# Patient Record
Sex: FEMALE | Race: WHITE | NOT HISPANIC OR LATINO | URBAN - METROPOLITAN AREA
[De-identification: names, ages, dates, MRNs, and addresses within clinical notes are randomized per-mention and may not be internally consistent; named-entity substitution may affect disease eponyms.]

---

## 2023-07-11 ENCOUNTER — APPOINTMENT (OUTPATIENT)
Dept: PRIMARY CARE | Facility: CLINIC | Age: 63
End: 2023-07-11
Payer: COMMERCIAL

## 2023-10-02 PROCEDURE — 99215 OFFICE O/P EST HI 40 MIN: CPT | Performed by: INTERNAL MEDICINE

## 2023-10-23 PROCEDURE — 99215 OFFICE O/P EST HI 40 MIN: CPT | Performed by: INTERNAL MEDICINE

## 2023-11-01 PROCEDURE — 99215 OFFICE O/P EST HI 40 MIN: CPT | Performed by: INTERNAL MEDICINE

## 2023-11-29 NOTE — PROGRESS NOTES
HISTORY OF PRESENT ILLNESS:  Harsh is a 63 y.o. female with a personal history of colon cancer (diagnosed 2x). She also has a family history of pancreatic, testicular and lung cancer.  She was referred to the Cancer Genetics Clinic at [facility] by her physician, Dr. Russell.  She is interested in genetic testing to clarify her personal risks for cancer, as well as the risks to her family members.   The appointment today was conducted via video.    CANCER MEDICAL HISTORY:  Personal History of Cancer?    Yes  Per med/onc note 11/20/23:   Hx of locally advanced colon carcinoma--dx at 53  T4 N0 M0 sigmoid colon carcinoma with perforation causing abscess as well as complete obstruction of left sigmoid colon-- s/p partial colectomy and Meliton procedure  Tumor was locally advanced and positive  Radial margin   58 lymph nodes were negative  10/13/13: Gross rumor was thought to be present postoperatively in radial margin grossly as well as microscopically positive   Had left sided hydroureteronephrosis secondary to mass effect from tumor, s/p insertion of left double-J ureteral catheter insertion  Received chemo using FOLFOX with Avastin  Finished 12 cycles of chemo  6/2014-- FUP PET scan was negative  7/2014-- received adjuvant radiation therapy  10/7/2014-- revision of colostomy-- coloproctostomy and additional resection of colon and no pathological disease was identified  *Did not do FUP colonoscopy or any other clinical FUP after 4/2018*  Per med/onc note 11/20/23:   7/2023-- found to have obstructive transverse colonoic mass  Found to have T3 N0 M0 stage IIa colon carcinoma s/p transverse colectomy (7/17/23) and found to have moderately differentiated carcinoma arising in transverse colon  35 lymph nodes were negative-- negative MSI was stable and all margins were negative  Recommended adjuvant Xeloda for 6 months  Signatera test baseline came back negative    PREVIOUS GENETIC TESTING?  No    CANCER  SCREENING HISTORY:  Mammograms?   Yes-- all previous mammograms have been normal.,  Breast biopsies?  No.  Vaginal ultrasound to screen for ovarian cancer?   No.  CA-125?   Yes--5/26/13-- 31  PAP smear?   Yes-- all normal  Colonoscopy?   Yes.  Endoscopy?  Yes.  Hysterectomy?  No.  Oophorectomy?  No.  Dermatology?   Mole by ear in 20's-- had it removed.    OTHER MEDICAL CONCERNS:  COPD  Anxiety   Chronic back pain  Lumbar laminectomy    REPRODUCTIVE HISTORY:  # Children:   4.  # Pregnancies:   5.   Age first birth:   22.  Breast feeding?:   No  Menarche (age):   10.  Menopause (age):   52.  OCP:    No   HRT:   No     SOCIAL HISTORY:  Not currently working-- used to work in resale shop-- processed jewelry and shoes.    FAMILY HISTORY:  A 4-generation pedigree was obtained. The family history was significant for the following:  Maternal aunt was diagnosed with pancreatic cancer in her early-50's-- she passed away at 80.  Maternal uncle was diagnosed with testicular cancer in his late 40s.  Maternal first cousin, male in his 60's, was diagnosed with testicular cancer in his 40's.  Maternal first cousin, male in his 60's, was diagnosed with testicular cancer in his 40's.  Maternal first cousin, male, was diagnosed with testicular cancer in his early 40's. He was diagnosed a second time with testicular cancer in his late 40's. He passed away at 50.  Maternal first cousin, 34 year old male, was diagnosed with testicular cancer in his 20's.  Mother had AFIB and passed away at 85 from an 'aneurysm in her neck.'  Maternal grandfather passed away at 36 from a 'bad heart.'  Maternal grandmother passed away at 66 -- she was a heavy smoker.    Father was diagnosed with lung cancer in his 60's -- he passed away in his 60's (he was a smoker).  Paternal grandmother passed away at 19 possibly from colon cancer (not been confirmed).    Consanguinity and Ashkenazi Jainism ancestry were denied. The patient's maternal side is of [facility]  and [place] descent, and the patient's paternal side is of [place] and [place] descent. The remainder of the family history was negative for intellectual disability, birth defects, miscarriages/stillbirths, blindness, deafness, kidney disease, heart disease, cancer, muscle disease, and blood disorders.    DISCUSSION:  Harsh is a 63 y.o. female with a personal history of colon cancer (diagnosed 2x). She also has a family history of pancreatic, testicular and lung cancer.  Harsh meets NCCN criteria for testing for the high risk breast and ovarian cancer based on her family history. She may meet criteria for the Talavera syndrome genes if her paternal grandmother truly had colon cancer.  Even though Harsh's tumor showed intact staining for mismatch repair proteins, it is possible her personal/family history could be due to Talavera syndrome. That screening has a 5-10% false negative rate.  It is also possible it could be due to a hereditary polyposis condition such as attenuated APC-related familial adenomatous polyposis (AFAP) or MUTYH-associated polyposis (MAP).  She is interested in testing, which is recommended, and will be ordered today via the 47-gene Common Hereditary Cancers panel and the 22-gene Guidelines Based Colorectal Cancer panel from [employer].   Our discussion is summarized below.     We discussed the genetics of colon cancer, and the features of inherited cancer syndromes.   We discussed that MOST cancers are not due to an inherited genetic susceptibility.   Red flags associated with hereditary colon cancer include factors such as:  young age at diagnosis (by or before age 50),   three or more family members with colon cancer,   family history of other cancers (such as uterine cancer) which can occur in families with hereditary colon cancer  However, in about 5-10% of families, there is an identifiable inherited genetic mutation that can make a person more susceptible to develop certain forms of  cancer.  A genetic mutation can be found in 9-26% of patients diagnosed with colorectal cancer before the age of 50 (PMID: 60989383).    Talavera syndrome, also known as hereditary non-polyposis colorectal cancer (HNPCC), is a type of inherited cancer of the digestive tract.   Up to 5% of all colon cancer cases are attributed to Talavera syndrome.  Changes in the Talavera syndrome genes (sometimes referred to as mutations) are inherited in a dominant pattern.  This means that if an individual has a change in either of these genes, their siblings and children have a 50% chance of also having that gene change and a 50% chance of not having the gene change.     People who have Talavera syndrome have a significantly increased risk of developing colorectal cancer and an increased risk of developing other types of cancers, such as endometrial (uterine), stomach, breast, ovarian, small bowel (intestinal), pancreatic, prostate, urinary tract, liver, kidney, and bile duct cancers.   The most common cancers are colorectal, endometrial, stomach, and ovarian.     Colorectal cancer risks for individuals WITH Talavera syndrome can be as high as 52%, but some studies have shown an even higher risk for colorectal cancer (general population's risk to develop colon cancer is 5.5%).  Endometrial cancer risk can be as high as 57% (general population's risk for endometrial cancer is up to 2.7% risk).  Stomach cancer risk can be as high as 16%, and ovarian cancer risk can be as high as 38% (general population risk to develop ovarian cancer is 1.3%).   Cancer risks vary depending on the gene mutation in the family.   Inherited mutations in five different genes are known to be associated with Talavera syndrome, including MLH1, MSH2, MSH6, PMS2, and EPCAM.    We also discussed mutations in other genes that can confer an increased risk for colon cancer and polyps, like the POLE and POLD1 genes.  Mutations in these genes have been identified in families that  "have oligopolyposis (5-20 colon adenomas) and early onset colon and uterine cancers.  Pathogenic (disease-causing) variants in the POLD1 and POLE genes have been observed in 0.2% and 0.3-0.6% of individuals with colorectal cancer, respectively (PMID: 91196441).    We then discussed the type of results that can be obtained with this testing.   The first is a positive result.  The next is a negative result.  The last result is a \"maybe\" which we call a variant of unknown significant, meaning that we found a change in your DNA but we are not sure at this time if it increases the risk for cancer or not.    We discussed that there are multiple genes associated with increased colon cancer (with or without polyposis).   Some genes are considered highly penetrant cancer genes, meaning a mutation in the gene confers a high risk of cancer and/or polyps.   On the other hand, there are other intermediate (moderate risk) cancer genes.  For many of the moderate risk genes, there is sometimes limited information regarding the degree to which a mutation in the gene affects risk of different types of cancers.  Additionally, for some of these moderate risk genes, the appropriate management for individuals who have a mutation in one of these genes is not always clear.   In many cases, even if an individual tests positive for a mutation in a moderate risk gene, recommendations are still based on the family history, not the positive test result.    She is interested in this approach, so genetic testing will be ordered via the 47-gene Common Hereditary Cancers panel and the 22-gene Guidelines Based Colorectal Cancer panel from [facility].   The 47-gene Common Hereditary Cancers panel which looks at the following genes: APC, JOY, AXIN2, BARD1, BMPR1A, BRCA1, BRCA2, BRIP1, CDH1, CDK4, CDKN2A, CHEK2, CTNNA1, DICER1, EPCAM, GREM1 HOXB13, KIT, MEN1, MLH1, MSH2, MSH3, MSH6, MUTYH, NBN, NF1, NTHL1, PALB2, PDGFRA, PMS2, POLD1, POLE, PTEN, RAD50, " RAD51C, RAD51D, SDHA, SDHB, SDHC, SDHD, SMAD4, SMARCA4, STK11, TP53, TSC1, TSC2, and VHL.  The 22-gene Guidelines Based Colorectal panel analyzes the following 22 genes: APC, AXIN2, BMPR1A, CHEK2, EPCAM, GREM1, MBD4, MLH1, MLH3, MSH2, MSH3, MSH6, MUTYH, NTHL1, PMS2, POLD1, POLE, PTEN, RNF43, SMAD4, STK11, TP53.    Your test results may be released to you when they are reported.   We will call to schedule a virtual follow-up appointment to review your test results once your sample is processing in the lab.  If you choose to view your results in advance of your visit, your provider may not be available to discuss.  Most people choose to review results with their provider at the visit.

## 2023-11-30 ENCOUNTER — TELEMEDICINE CLINICAL SUPPORT (OUTPATIENT)
Dept: GENETICS | Facility: CLINIC | Age: 63
End: 2023-11-30
Payer: MEDICARE

## 2023-11-30 DIAGNOSIS — Z80.43 FAMILY HISTORY OF TESTICULAR CANCER: ICD-10-CM

## 2023-11-30 DIAGNOSIS — Z80.0 FAMILY HISTORY OF PANCREATIC CANCER: ICD-10-CM

## 2023-11-30 DIAGNOSIS — C18.9 MALIGNANT NEOPLASM OF COLON, UNSPECIFIED PART OF COLON (MULTI): ICD-10-CM

## 2023-11-30 DIAGNOSIS — Z80.1 FAMILY HISTORY OF LUNG CANCER: ICD-10-CM

## 2023-11-30 PROBLEM — C62.90 TESTICULAR CANCER (MULTI): Status: ACTIVE | Noted: 2023-11-30

## 2023-11-30 PROCEDURE — GENMD PR GENETICS VISIT (MEDICAID/MEDICARE): Performed by: GENETIC COUNSELOR, MS

## 2023-12-04 ENCOUNTER — APPOINTMENT (OUTPATIENT)
Dept: GENETICS | Facility: HOSPITAL | Age: 63
End: 2023-12-04
Payer: COMMERCIAL

## 2023-12-05 NOTE — PATIENT INSTRUCTIONS
PLAN:  She elected to undergo genetic testing via a panel test that analyzes 47 genes  associated with cancers of the breast, ovary, uterus, prostate, and gastrointestinal system and 22 genes associated with a predisposition to colorectal cancer.  Consent for testing was obtained and a blood sample was drawn today.   Harsh will be contacted to schedule a virtual follow-up to review her test results when her sample is processing in the lab.  At that time, we will make recommendations for both Harsh and her family members in terms of cancer screening and/or cancer risk reduction options.  We remain available to Harsh and her family members at [phone] if any questions arise regarding information discussed at today's visit.    Ban Zamora, [facility] [place]  Certified Genetic Counselor  [facility]  [phone]    Reviewed by:  Dr. Mare Degroot  Clinical   [facility]  [phone]

## 2023-12-14 ENCOUNTER — TELEPHONE (OUTPATIENT)
Dept: GENETICS | Facility: CLINIC | Age: 63
End: 2023-12-14
Payer: COMMERCIAL

## 2023-12-14 NOTE — TELEPHONE ENCOUNTER
I spoke with the patient this morning regarding their outstanding genetic testing sample. The patient has been feeling under the weather lately and has not been able to provide their sample. They are still interested and plan to proceed when they are feeling better.

## 2023-12-28 NOTE — TELEPHONE ENCOUNTER
I left a message for the patient regarding their outstanding genetic testing sample ordered by [facility]. I provided my direct line for the patient to confirm if they would like to proceed with testing.

## 2024-05-07 ENCOUNTER — APPOINTMENT (OUTPATIENT)
Dept: SURGERY | Facility: CLINIC | Age: 64
End: 2024-05-07
Payer: COMMERCIAL

## 2024-07-09 ENCOUNTER — APPOINTMENT (OUTPATIENT)
Dept: SURGERY | Facility: CLINIC | Age: 64
End: 2024-07-09
Payer: MEDICARE

## 2024-07-09 DIAGNOSIS — C18.4 MALIGNANT NEOPLASM OF TRANSVERSE COLON (MULTI): Primary | ICD-10-CM

## 2024-07-09 PROCEDURE — 99203 OFFICE O/P NEW LOW 30 MIN: CPT | Performed by: PHYSICIAN ASSISTANT

## 2024-07-09 NOTE — PROGRESS NOTES
Subjective   Patient ID: Harsh Vinson is a 64 y.o. female who presents for talk about colonoscopy      HPI  64-year-old female with a history of colon cancer patient had a transverse colectomy on July 17, 2023.  She sent here by her oncologist to get a colonoscopy.  Dr. Tubbs.  Patient has chronic diarrhea since her surgery.  She not complain of any abdominal pain no nausea no vomiting.  She does have a history of chronic pain syndrome and uses medical marijuana oils for her pains.  Patient completed all her chemotherapy in March of this year.    Review of Systems  Review of systems is negative other than what is mentioned above        Physical Exam  Eyes: Conjunctiva non -icteric and eye lids are without obvious rash or drooping. Pupils are symmetric.   Ears, Nose, Mouth, and Throat: External ears and nose appear to be without deformity or rash. No lesions or masses noted. Hearing is grossly intact.   Neck:. No JVD noted, tracheal position is midline. No thyromegaly, no thyroid nodules  Head and Face: Examination of the head and face revealed no abnormalities.   Respiratory: No gasping or shortness of breath noted, no use of accessory muscles noted.  Lungs are clear to auscultate  Cardiovascular: Examination for edema is normal, regular rate and rhythm S1-S2  GI: Abdomen non tender to palpation, bowel sounds are present old scar midline abdomen.  Skin: No rashes or open lesions/ulcers identified on skin.   Musk: Digits/nails show no clubbing or cyanosis. No asymmetry or masses noted of the musculature. Examination of the muscles/joints/bones show normal range of motion. Gait is grossly normally.   Neurologic: Cranial nerves II- XII intact, motor strength 5/5 muscle strength of the lower extremities bilaterally and equal.      Objective     No diagnosis found.   Patient Active Problem List   Diagnosis    Colon cancer (Multi)    Family history of pancreatic cancer    Family history of testicular cancer     Family history of lung cancer      Not on File   Medication Documentation Review Audit    **Prior to Admission medications have not yet been reviewed**         Past Medical History:   Diagnosis Date    Anxiety disorder, unspecified     Anxiety    Personal history of malignant neoplasm, unspecified     Personal history of malignant neoplasm    Personal history of other diseases of the musculoskeletal system and connective tissue     Personal history of arthritis     Social History     Tobacco Use   Smoking Status Not on file   Smokeless Tobacco Not on file     No family history on file.   Past Surgical History:   Procedure Laterality Date    COLONOSCOPY  02/28/2014    Complete Colonoscopy    OTHER SURGICAL HISTORY  11/22/2013    Laparo Part Colectomy W/ End Colostomy, Clos Distal Segment    OTHER SURGICAL HISTORY  10/31/2014    Ileostomy Reversal    OTHER SURGICAL HISTORY  10/31/2014    Lysis Of Peritoneal Adhesions       Assessment/Plan   Today we had a discussion about colonoscopy.  Patient was informed this is done as an outpatient surgery.  It takes around 30 minutes.  Patient will require a colon prep.  It is done under monitored anesthesia care.  Alternatives to procedure were discussed.  All questions answered risk and benefits were discussed.  Patient had complete understanding.  Patient would like to proceed.    Encounter Diagnosis   Name Primary?    Malignant neoplasm of transverse colon (Multi) Yes     I have reviewed all data including labs,radiologic and previous reports.      Sunitha Alcala PA-C

## 2024-11-08 ENCOUNTER — OFFICE VISIT (OUTPATIENT)
Dept: PRIMARY CARE | Facility: CLINIC | Age: 64
End: 2024-11-08
Payer: COMMERCIAL

## 2024-11-08 ENCOUNTER — HOSPITAL ENCOUNTER (OUTPATIENT)
Dept: RADIOLOGY | Facility: EXTERNAL LOCATION | Age: 64
Discharge: HOME | End: 2024-11-08

## 2024-11-08 VITALS
HEART RATE: 87 BPM | BODY MASS INDEX: 20.41 KG/M2 | HEIGHT: 66 IN | SYSTOLIC BLOOD PRESSURE: 116 MMHG | DIASTOLIC BLOOD PRESSURE: 78 MMHG | WEIGHT: 127 LBS | OXYGEN SATURATION: 100 %

## 2024-11-08 DIAGNOSIS — Z00.00 MEDICARE ANNUAL WELLNESS VISIT, SUBSEQUENT: Primary | ICD-10-CM

## 2024-11-08 DIAGNOSIS — Z12.31 BREAST CANCER SCREENING BY MAMMOGRAM: ICD-10-CM

## 2024-11-08 DIAGNOSIS — I83.893 VARICOSE VEINS OF LEG WITH EDEMA, BILATERAL: ICD-10-CM

## 2024-11-08 DIAGNOSIS — R53.83 OTHER FATIGUE: ICD-10-CM

## 2024-11-08 DIAGNOSIS — Z13.820 ENCOUNTER FOR OSTEOPOROSIS SCREENING IN ASYMPTOMATIC POSTMENOPAUSAL PATIENT: ICD-10-CM

## 2024-11-08 DIAGNOSIS — Z13.220 LIPID SCREENING: ICD-10-CM

## 2024-11-08 DIAGNOSIS — Z78.0 ENCOUNTER FOR OSTEOPOROSIS SCREENING IN ASYMPTOMATIC POSTMENOPAUSAL PATIENT: ICD-10-CM

## 2024-11-08 DIAGNOSIS — N28.89: ICD-10-CM

## 2024-11-08 ASSESSMENT — ACTIVITIES OF DAILY LIVING (ADL)
DOING_HOUSEWORK: INDEPENDENT
MANAGING_FINANCES: INDEPENDENT
TAKING_MEDICATION: INDEPENDENT
BATHING: INDEPENDENT
GROCERY_SHOPPING: INDEPENDENT
DRESSING: INDEPENDENT

## 2024-11-08 ASSESSMENT — PATIENT HEALTH QUESTIONNAIRE - PHQ9
2. FEELING DOWN, DEPRESSED OR HOPELESS: NOT AT ALL
SUM OF ALL RESPONSES TO PHQ9 QUESTIONS 1 AND 2: 0
1. LITTLE INTEREST OR PLEASURE IN DOING THINGS: NOT AT ALL

## 2024-11-08 NOTE — PROGRESS NOTES
Subjective   Patient ID: Harsh Vinson is a 64 y.o. female who presents for the following        Has both commercial and medicare. Will need physical and AMW       Assessment/Plan   Preventative Medicine  -Had initial COVID vaccine. Wants flu and pneumonia vaccine today.   -PHQ2:0  -GAD7:0  -Alcohol screen done   -MMG done 3 years ago. Will order now  -Colon cancer hx: follows with Dr Russell and Dr Martinez. Had colonoscopy in August which she states was okay.   -Osteoporosis screening: DEXA ordered  -Lung Cancer Screen: had CT chest in April 2024 which was unremarkable  -ACP: Alonso Vinson is HCPOA (spouse). Does not have a living well or advanced directive. Code status discussed and patient would like to remain full code. (>16 minute)    ADHD  -Was treated in her 20s but she thinks that it is returning. Will refer to psychology for ADHD evaluation and if she does have ADHD, will initiate medication therapy.     Hx of Colon Cancer  -In remission  -S/P colectomy in July 2023  -Finished medical therapy   -Currently undergoing surveillence with med-onc and surgery.   -Had colonoscopy with 2 polyps removed in August 2024. Pathology showed benign elements              HPI    PMH: Colon cancer   Surgeries: hx of lumbar back surgery in 2000, hx of ostomy after colon cancer resection; currently reversed.     Family Hx   -Lung cancer in grandmother and father     Social Hx  T: quit 16 months ago. Previously smoked 1 ppd x 50 years.   A: occasional  D: denies    Personal  -On disability  -  -4 kids       Past Medical History:   Diagnosis Date    Anxiety disorder, unspecified     Anxiety    Personal history of malignant neoplasm, unspecified     Personal history of malignant neoplasm    Personal history of other diseases of the musculoskeletal system and connective tissue     Personal history of arthritis       Past Surgical History:   Procedure Laterality Date    COLONOSCOPY  02/28/2014    Complete Colonoscopy     "OTHER SURGICAL HISTORY  11/22/2013    Laparo Part Colectomy W/ End Colostomy, Clos Distal Segment    OTHER SURGICAL HISTORY  10/31/2014    Ileostomy Reversal    OTHER SURGICAL HISTORY  10/31/2014    Lysis Of Peritoneal Adhesions       Social Drivers of Health     Tobacco Use: Medium Risk (11/8/2024)    Patient History     Smoking Tobacco Use: Former     Smokeless Tobacco Use: Never     Passive Exposure: Not on file   Alcohol Use: Not on file   Financial Resource Strain: Not on file   Food Insecurity: Not on file   Transportation Needs: Not on file   Physical Activity: Not on file   Stress: Not on file   Social Connections: Not on file   Intimate Partner Violence: Not on file   Depression: Not on file   Housing Stability: Not on file   Utilities: Not on file   Digital Equity: Not on file   Health Literacy: Not on file         Visit Vitals  /78   Pulse 87   Ht 1.676 m (5' 6\")   Wt 57.6 kg (127 lb)   SpO2 100%   BMI 20.50 kg/m²   Smoking Status Former   BSA 1.64 m²     PHYSICAL EXAM   Physical Exam       General: NAD. NCAT. Aox3   HEENT: PERRLA. EOMI. MMM. Nares patent bl.  Cardiovascular: RRR. No MRG. S1/S2 wnl.   Respiratory: CTABL. No acute respiratory distress.   GI: Soft, NT abdomen. BS present x 4.   : No CVAT BL  MSK: ROM x 4. CTLS non-tender.   Extremities: No edema. Cap refill < 2 sec.   Skin: No rashes or bruises.   Neuro: Aox3. Cranial Nerves grossly intact. Motor/sensory wnl.   Psych: Mood wnl.      REVIEW OF SYSTEMS       No Known Allergies    Current Outpatient Medications   Medication Sig Dispense Refill    medical cannabis Cannabis Oil, ORAL, PRN, Date: 8/23/24 6:28:00 AM EDT, Supply       No current facility-administered medications for this visit.       Objective     No visits with results within 4 Month(s) from this visit.   Latest known visit with results is:   No results found for any previous visit.       Radiology: Reviewed imaging in powerchart.  No results found.    No family history on " file.  Social History     Socioeconomic History    Marital status:    Tobacco Use    Smoking status: Former     Types: Cigarettes    Smokeless tobacco: Never   Substance and Sexual Activity    Alcohol use: Yes     Comment: rare    Drug use: Yes     Types: Marijuana     Past Medical History:   Diagnosis Date    Anxiety disorder, unspecified     Anxiety    Personal history of malignant neoplasm, unspecified     Personal history of malignant neoplasm    Personal history of other diseases of the musculoskeletal system and connective tissue     Personal history of arthritis     Past Surgical History:   Procedure Laterality Date    COLONOSCOPY  02/28/2014    Complete Colonoscopy    OTHER SURGICAL HISTORY  11/22/2013    Laparo Part Colectomy W/ End Colostomy, Clos Distal Segment    OTHER SURGICAL HISTORY  10/31/2014    Ileostomy Reversal    OTHER SURGICAL HISTORY  10/31/2014    Lysis Of Peritoneal Adhesions       Charting was completed using voice recognition technology and may include unintended errors.

## 2025-06-26 ENCOUNTER — OFFICE VISIT (OUTPATIENT)
Dept: PRIMARY CARE | Facility: CLINIC | Age: 65
End: 2025-06-26
Payer: COMMERCIAL

## 2025-06-26 VITALS
OXYGEN SATURATION: 100 % | HEART RATE: 81 BPM | BODY MASS INDEX: 20.5 KG/M2 | DIASTOLIC BLOOD PRESSURE: 88 MMHG | HEIGHT: 66 IN | SYSTOLIC BLOOD PRESSURE: 130 MMHG | TEMPERATURE: 97.5 F

## 2025-06-26 DIAGNOSIS — J43.2 CENTRILOBULAR EMPHYSEMA (MULTI): ICD-10-CM

## 2025-06-26 DIAGNOSIS — Z13.820 ENCOUNTER FOR OSTEOPOROSIS SCREENING IN ASYMPTOMATIC POSTMENOPAUSAL PATIENT: ICD-10-CM

## 2025-06-26 DIAGNOSIS — Z78.0 ENCOUNTER FOR OSTEOPOROSIS SCREENING IN ASYMPTOMATIC POSTMENOPAUSAL PATIENT: ICD-10-CM

## 2025-06-26 DIAGNOSIS — H66.91 RIGHT OTITIS MEDIA, UNSPECIFIED OTITIS MEDIA TYPE: Primary | ICD-10-CM

## 2025-06-26 DIAGNOSIS — C18.4 MALIGNANT NEOPLASM OF TRANSVERSE COLON (MULTI): ICD-10-CM

## 2025-06-26 DIAGNOSIS — Z13.220 LIPID SCREENING: ICD-10-CM

## 2025-06-26 DIAGNOSIS — F41.1 GENERALIZED ANXIETY DISORDER: ICD-10-CM

## 2025-06-26 PROCEDURE — 1036F TOBACCO NON-USER: CPT | Performed by: STUDENT IN AN ORGANIZED HEALTH CARE EDUCATION/TRAINING PROGRAM

## 2025-06-26 PROCEDURE — 99214 OFFICE O/P EST MOD 30 MIN: CPT | Performed by: STUDENT IN AN ORGANIZED HEALTH CARE EDUCATION/TRAINING PROGRAM

## 2025-06-26 PROCEDURE — 1160F RVW MEDS BY RX/DR IN RCRD: CPT | Performed by: STUDENT IN AN ORGANIZED HEALTH CARE EDUCATION/TRAINING PROGRAM

## 2025-06-26 PROCEDURE — 1159F MED LIST DOCD IN RCRD: CPT | Performed by: STUDENT IN AN ORGANIZED HEALTH CARE EDUCATION/TRAINING PROGRAM

## 2025-06-26 PROCEDURE — 1123F ACP DISCUSS/DSCN MKR DOCD: CPT | Performed by: STUDENT IN AN ORGANIZED HEALTH CARE EDUCATION/TRAINING PROGRAM

## 2025-06-26 RX ORDER — AMOXICILLIN AND CLAVULANATE POTASSIUM 875; 125 MG/1; MG/1
875 TABLET, FILM COATED ORAL 2 TIMES DAILY
Qty: 14 TABLET | Refills: 0 | Status: SHIPPED | OUTPATIENT
Start: 2025-06-26 | End: 2025-07-03

## 2025-06-26 RX ORDER — BUSPIRONE HYDROCHLORIDE 5 MG/1
5 TABLET ORAL 2 TIMES DAILY
Qty: 60 TABLET | Refills: 0 | Status: SHIPPED | OUTPATIENT
Start: 2025-06-26 | End: 2026-06-26

## 2025-06-26 RX ORDER — ALBUTEROL SULFATE 90 UG/1
2 INHALANT RESPIRATORY (INHALATION) EVERY 4 HOURS PRN
Qty: 8.5 G | Refills: 5 | Status: SHIPPED | OUTPATIENT
Start: 2025-06-26 | End: 2026-06-26

## 2025-06-26 RX ORDER — BUDESONIDE AND FORMOTEROL FUMARATE DIHYDRATE 80; 4.5 UG/1; UG/1
2 AEROSOL RESPIRATORY (INHALATION)
Qty: 10.2 G | Refills: 5 | Status: SHIPPED | OUTPATIENT
Start: 2025-06-26 | End: 2026-06-26

## 2025-06-26 NOTE — PROGRESS NOTES
Subjective   Patient ID: Harsh Vinson is a 65 y.o. female who presents for the following        Has both commercial and medicare. Will need physical and AMW       Assessment/Plan   Preventative Medicine (11/2024)  -PHQ2:0  -GAD7:0  -Alcohol screen done   -MMG done 2021. Order given in Nov 2024, pt never had it done. Reprinted order today and advised patient to schedule.   -Colon cancer hx: follows with Dr Russell and Dr Martinez. Had colonoscopy in August which she states was okay.   -Osteoporosis screening: DEXA ordered. Pt never had it done. Order given again today.   -Lung Cancer Screen: had CT chest in Dec 2024 which was unremarkable  -ACP: Alonso Vinson is HCPOA (spouse). Does not have a living well or advanced directive. Code status discussed and patient would like to remain full code. (>16 minute)    ADHD  -Was treated in her 20s but she thinks that it is returning. -Previously referred to psychology, but patient has not seen them yet. Will address once seen by psychology     Hx of Colon Cancer  -In remission  -S/P colectomy in July 2023  -Finished medical therapy   -Currently undergoing surveillence with med-onc and surgery.   -Had colonoscopy with 2 polyps removed in August 2024. Pathology showed benign elements    Intermittent SOB  Emphysema   -Hx of 50py smoking hx   -Quit in July 2023  -CT chest from prior shows evidence of emphysema - moderate  PLAN  -Spirometry in office ordered. Shows mild obstruction  -Start Symbicort 2 puffs BID   -Start PRN albuterol for SOB/Wheezing     Generalized Anxiety  -No SI/HI  -Started after dx of cancer   -No depression reported  PLAN  -Buspar 5mg PO BID x 30 days  -RTC 1 month     R Otitis Media   PLAN  -Augmentin BID x 7 days   -RTC if symptoms worsen   -Can take OTC APAP for fevers     HPI    65F presents for acute sick visit  3-4 days of R ear pain. Previous hx as well  Had occasional fevers/chills.   Has associated sore throat, congestion  No ear drainage   Also  affecting hearing on the R side   No sick contacts.   No recent swimming pools, lakes. Hot tubs.     Preventative care discussed.   Pt has not done MMG or DEXA scan yet. Wants to do them. Orders given again to patient.     CT chest in Dec 2024 shows moderate CL emphysema. Pt interested in starting inhaler therapy. Spirometry done in office. Counseling given.     Denies weight loss, lightheadedness, dizziness, vision changes,CP, SOB, palpitations, n/v/d, abd pain, black/bloody stools, arthralgias, mood disturbance, or new numbness/weakness/tingling in arms/legs/face.      PMH: Colon cancer   Surgeries: hx of lumbar back surgery in 2000, hx of ostomy after colon cancer resection; currently reversed.     Family Hx   -Lung cancer in grandmother and father     Social Hx  T: quit July 2023. Previously smoked 1 ppd x 50 years.   A: occasional  D: denies    Personal  -On disability  -  -4 kids       Past Medical History:   Diagnosis Date    Anxiety disorder, unspecified     Anxiety    Personal history of malignant neoplasm, unspecified     Personal history of malignant neoplasm    Personal history of other diseases of the musculoskeletal system and connective tissue     Personal history of arthritis       Past Surgical History:   Procedure Laterality Date    COLONOSCOPY  02/28/2014    Complete Colonoscopy    OTHER SURGICAL HISTORY  11/22/2013    Laparo Part Colectomy W/ End Colostomy, Clos Distal Segment    OTHER SURGICAL HISTORY  10/31/2014    Ileostomy Reversal    OTHER SURGICAL HISTORY  10/31/2014    Lysis Of Peritoneal Adhesions       Social Drivers of Health     Tobacco Use: Medium Risk (6/26/2025)    Patient History     Smoking Tobacco Use: Former     Smokeless Tobacco Use: Never     Passive Exposure: Not on file   Alcohol Use: Not on file   Financial Resource Strain: Not on file   Food Insecurity: Not on file   Transportation Needs: Not on file   Physical Activity: Not on file   Stress: Not on file   Social  "Connections: Not on file   Intimate Partner Violence: Not on file   Depression: Not at risk (11/8/2024)    PHQ-2     PHQ-2 Score: 0   Housing Stability: Not on file   Utilities: Not on file   Digital Equity: Not on file   Health Literacy: Not on file         Visit Vitals  /88   Pulse 81   Temp 36.4 °C (97.5 °F)   Ht 1.676 m (5' 6\")   SpO2 100%   BMI 20.50 kg/m²   Smoking Status Former   BSA 1.64 m²     PHYSICAL EXAM   Physical Exam       General: NAD. NCAT. Aox3   HEENT: PERRLA. EOMI. MMM. Nares patent bl. R ear pain with otoscopic exam. No drainage. Moderate cerumen burden. Posterior pharyngeal erythema without exudate. No CLAD.   Cardiovascular: RRR. No MRG. S1/S2 wnl.   Respiratory: CTABL. No acute respiratory distress.   GI: Soft, NT abdomen. BS present x 4.   MSK: ROM x 4. CTLS non-tender.   Extremities: No edema. Cap refill < 2 sec.   Skin: No rashes or bruises.   Neuro: Aox3. Cranial Nerves grossly intact. Motor/sensory wnl.   Psych: Mood wnl.      REVIEW OF SYSTEMS   ROS IN HPI     Allergies   Allergen Reactions    Ceftriaxone Itching       Current Outpatient Medications   Medication Sig Dispense Refill    medical cannabis Cannabis Oil, ORAL, PRN, Date: 8/23/24 6:28:00 AM EDT, Supply       No current facility-administered medications for this visit.       Objective     No visits with results within 4 Month(s) from this visit.   Latest known visit with results is:   No results found for any previous visit.       Radiology: Reviewed imaging in powerchart.  No results found.    No family history on file.  Social History     Socioeconomic History    Marital status:    Tobacco Use    Smoking status: Former     Types: Cigarettes    Smokeless tobacco: Never   Substance and Sexual Activity    Alcohol use: Yes     Comment: rare    Drug use: Yes     Types: Marijuana     Past Medical History:   Diagnosis Date    Anxiety disorder, unspecified     Anxiety    Personal history of malignant neoplasm, " unspecified     Personal history of malignant neoplasm    Personal history of other diseases of the musculoskeletal system and connective tissue     Personal history of arthritis     Past Surgical History:   Procedure Laterality Date    COLONOSCOPY  02/28/2014    Complete Colonoscopy    OTHER SURGICAL HISTORY  11/22/2013    Laparo Part Colectomy W/ End Colostomy, Clos Distal Segment    OTHER SURGICAL HISTORY  10/31/2014    Ileostomy Reversal    OTHER SURGICAL HISTORY  10/31/2014    Lysis Of Peritoneal Adhesions       Charting was completed using voice recognition technology and may include unintended errors.

## 2025-06-27 LAB
CHOLEST SERPL-MCNC: 247 MG/DL
CHOLEST/HDLC SERPL: 2.7 (CALC)
HDLC SERPL-MCNC: 93 MG/DL
LDLC SERPL CALC-MCNC: 131 MG/DL (CALC)
NONHDLC SERPL-MCNC: 154 MG/DL (CALC)
TRIGL SERPL-MCNC: 120 MG/DL
TSH SERPL-ACNC: 1.74 MIU/L (ref 0.4–4.5)

## 2025-06-30 ENCOUNTER — TELEPHONE (OUTPATIENT)
Dept: PRIMARY CARE | Facility: CLINIC | Age: 65
End: 2025-06-30
Payer: COMMERCIAL

## 2025-06-30 DIAGNOSIS — H66.91 RIGHT OTITIS MEDIA, UNSPECIFIED OTITIS MEDIA TYPE: ICD-10-CM

## 2025-06-30 NOTE — TELEPHONE ENCOUNTER
Informed patient of result. Expressed understanding.     She is still having ear pain and cannot hear out of her ear. Please advise on recommendations.

## 2025-06-30 NOTE — TELEPHONE ENCOUNTER
----- Message from Celia Klein sent at 6/28/2025  2:24 AM EDT -----  Elevated lipids. Recommend low fat, low carb diet and 30 minutes of aerobic exercise at least 3 times weekly. Follow up in 6-12 months for repeat lipids.     Moderately elevated ASCVD risk. Should be on ASA and statin. If agreeable, set up telephone visit to discuss medications.   ----- Message -----  From: Thomas Swann Results In  Sent: 6/26/2025  10:16 PM EDT  To: Celia Klein MD

## 2025-07-02 NOTE — TELEPHONE ENCOUNTER
Patient called back but wasn't able to answer. Tried calling back patient and no answer. Left voicemail with Dr. Ramirez's information.

## 2025-07-08 ENCOUNTER — APPOINTMENT (OUTPATIENT)
Dept: PRIMARY CARE | Facility: CLINIC | Age: 65
End: 2025-07-08
Payer: COMMERCIAL

## 2025-07-08 ENCOUNTER — PATIENT OUTREACH (OUTPATIENT)
Dept: CARE COORDINATION | Facility: CLINIC | Age: 65
End: 2025-07-08

## 2025-07-08 DIAGNOSIS — Z12.31 ENCOUNTER FOR SCREENING MAMMOGRAM FOR BREAST CANCER: ICD-10-CM

## 2025-07-31 ENCOUNTER — APPOINTMENT (OUTPATIENT)
Dept: PRIMARY CARE | Facility: CLINIC | Age: 65
End: 2025-07-31
Payer: COMMERCIAL